# Patient Record
Sex: MALE | Race: WHITE | NOT HISPANIC OR LATINO | URBAN - METROPOLITAN AREA
[De-identification: names, ages, dates, MRNs, and addresses within clinical notes are randomized per-mention and may not be internally consistent; named-entity substitution may affect disease eponyms.]

---

## 2017-06-13 ENCOUNTER — CONVERSION ENCOUNTER (OUTPATIENT)
Dept: FAMILY MEDICINE CLINIC | Facility: CLINIC | Age: 42
End: 2017-06-13

## 2017-06-13 LAB
ALBUMIN SERPL-MCNC: 4.6 G/DL (ref 3.6–5.1)
ALBUMIN/GLOB SERPL: NORMAL {RATIO} (ref 1–2.1)
ALP SERPL-CCNC: 48 UNITS/L (ref 40–115)
ALT SERPL-CCNC: 15 UNITS/L (ref 8–46)
AST SERPL-CCNC: 24 UNITS/L (ref 10–40)
BASOPHILS NFR BLD AUTO: 1 %
BILIRUB SERPL-MCNC: 1 MG/DL (ref 0.2–1.2)
BUN SERPL-MCNC: 12 MG/DL (ref 7–25)
BUN/CREAT SERPL: NORMAL (ref 6–22)
CALCIUM SERPL-MCNC: 9.4 MG/DL (ref 8.6–10.2)
CHLORIDE SERPL-SCNC: 99 MMOL/L (ref 98–110)
CHOLEST SERPL-MCNC: 199 MG/DL (ref 125–200)
CHOLEST/HDLC SERPL: NORMAL {RATIO}
CONV CO2: 28 MMOL/L (ref 21–33)
CONV NEUTROPHILS/100 LEUKOCYTES IN BODY FLUID BY MANUAL COUNT: 51 %
CONV TOTAL PROTEIN: 6.8 G/DL (ref 6.2–8.3)
CREAT UR-MCNC: 0.8 MG/DL (ref 0.78–1.34)
EOSINOPHIL # BLD AUTO: 6 %
EOSINOPHIL # BLD AUTO: NORMAL 10*3/MM3 (ref 15–500)
ERYTHROCYTE [DISTWIDTH] IN BLOOD BY AUTOMATED COUNT: 12.5 % (ref 11–15)
GLOBULIN UR ELPH-MCNC: NORMAL G/DL (ref 2.1–3.7)
GLUCOSE SERPL-MCNC: 81 MG/DL (ref 65–99)
HCT VFR BLD AUTO: 44.3 % (ref 38.5–50)
HDLC SERPL-MCNC: 74 MG/DL
HGB BLD-MCNC: 15 G/DL (ref 13.2–17.1)
LDLC SERPL CALC-MCNC: 114 MG/DL
LYMPHOCYTES # BLD AUTO: NORMAL 10*3/MM3 (ref 850–3900)
LYMPHOCYTES NFR BLD AUTO: 33 %
MCH RBC QN AUTO: 31.6 PG (ref 27–33)
MCHC RBC AUTO-ENTMCNC: NORMAL % (ref 32–36)
MCV RBC AUTO: 93.2 FL (ref 80–100)
MONOCYTES # BLD AUTO: NORMAL 10*3/MICROLITER (ref 200–950)
MONOCYTES NFR BLD AUTO: 10 %
NEUTROPHILS # BLD AUTO: NORMAL 10*3/MM3 (ref 1500–7800)
PLATELET # BLD AUTO: NORMAL 10*3/MM3 (ref 140–400)
PMV BLD AUTO: 8.3 FL (ref 7.5–11.5)
POTASSIUM SERPL-SCNC: 4.2 MMOL/L (ref 3.5–5.3)
PSA SERPL-MCNC: 0.7 NG/ML
RBC # BLD AUTO: NORMAL 10*6/MM3 (ref 4.2–5.8)
SODIUM SERPL-SCNC: 136 MMOL/L (ref 135–146)
TRIGL SERPL-MCNC: 57 MG/DL
TSH SERPL-ACNC: 3.05 MIU/L (ref 0.4–4.5)
WBC # BLD AUTO: NORMAL 10*3/MM3 (ref 3.8–10.8)

## 2020-07-02 ENCOUNTER — OFFICE VISIT (OUTPATIENT)
Dept: FAMILY MEDICINE CLINIC | Facility: CLINIC | Age: 45
End: 2020-07-02

## 2020-07-02 VITALS
HEART RATE: 72 BPM | SYSTOLIC BLOOD PRESSURE: 124 MMHG | WEIGHT: 155 LBS | BODY MASS INDEX: 22.19 KG/M2 | HEIGHT: 70 IN | TEMPERATURE: 98.2 F | DIASTOLIC BLOOD PRESSURE: 73 MMHG | OXYGEN SATURATION: 98 %

## 2020-07-02 DIAGNOSIS — Z00.00 PREVENTATIVE HEALTH CARE: Primary | ICD-10-CM

## 2020-07-02 DIAGNOSIS — Z12.5 SCREENING PSA (PROSTATE SPECIFIC ANTIGEN): ICD-10-CM

## 2020-07-02 DIAGNOSIS — J30.9 ALLERGIC RHINITIS, UNSPECIFIED SEASONALITY, UNSPECIFIED TRIGGER: ICD-10-CM

## 2020-07-02 DIAGNOSIS — H92.01 EAR PAIN, RIGHT: ICD-10-CM

## 2020-07-02 PROBLEM — F40.240 CLAUSTROPHOBIA: Status: ACTIVE | Noted: 2017-06-12

## 2020-07-02 PROCEDURE — 99213 OFFICE O/P EST LOW 20 MIN: CPT | Performed by: NURSE PRACTITIONER

## 2020-07-02 RX ORDER — FLUTICASONE PROPIONATE 50 MCG
2 SPRAY, SUSPENSION (ML) NASAL DAILY
COMMUNITY

## 2020-07-02 RX ORDER — AZITHROMYCIN 250 MG/1
TABLET, FILM COATED ORAL
Qty: 6 TABLET | Refills: 0 | Status: SHIPPED | OUTPATIENT
Start: 2020-07-02 | End: 2020-08-19

## 2020-07-02 NOTE — PATIENT INSTRUCTIONS
Take Z-Pavan as directed with food  Allergy medication as discussed  Stop using Debrox eardrops so much  Fasting lipid panel when possible  Schedule eye exam

## 2020-07-02 NOTE — PROGRESS NOTES
"    Felix Jarrell is a 44 y.o. male.     44-year-old white male with history of claustrophobia, allergic rhinitis who comes in today complaining of right ear with some drainage in the morning.  Seems a little opatient paranoid about his ears and uses Debrox quite often.  Ear canals were not red or tender to touch and there is liquid wax in ear canals which is probably from Debrox.  No infection seen with TMs however he does have a odd anatomy.  I am placing him on a Z-Pavan at patient's request.  He takes no other medications.  I recommended he take allergy medication to help with muffled ear sounds due to bulging TM's  Blood pressure 124/72 heart rate 72 he denies any chest pain, dyspnea, tachycardia or dizziness  Weight is 155  Patient has not had any blood work or preventative maintenance in almost 4 years I am drawing CBC CMP and PSA today he is going to get a fasting cholesterol panel and schedule an eye exam    Z-Pavan  CBC/CMP/PSA  Schedule fasting lipid panel   schedule eye exam  Do not use Debrox eardrops to frequently         The following portions of the patient's history were reviewed and updated as appropriate: allergies, current medications, past family history, past medical history, past social history, past surgical history and problem list.    Vitals:    07/02/20 1036   BP: 124/73   BP Location: Right arm   Patient Position: Sitting   Cuff Size: Adult   Pulse: 72   Temp: 98.2 °F (36.8 °C)   TempSrc: Temporal   SpO2: 98%   Weight: 70.3 kg (155 lb)   Height: 177.8 cm (70\")     Body mass index is 22.24 kg/m².    History reviewed. No pertinent past medical history.  History reviewed. No pertinent surgical history.  History reviewed. No pertinent family history.    There is no immunization history on file for this patient.    Conversion Encounter on 06/13/2017   Component Date Value Ref Range Status   • Albumin 06/13/2017 4.6  3.6 - 5.1 g/dL Final   • Alkaline Phosphatase 06/13/2017 48  40 - 115 units/L " Final   • Basophil Rel % 06/13/2017 1  % Final   • Glucose 06/13/2017 81  65 - 99 mg/dL Final   • Total Bilirubin 06/13/2017 1.0  0.2 - 1.2 mg/dL Final   • BUN 06/13/2017 12  7 - 25 mg/dL Final   • BUN/Creatinine Ratio 06/13/2017 15.0 (calc)  6 - 22 Final   • Calcium 06/13/2017 9.4  8.6 - 10.2 mg/dL Final   • Chloride 06/13/2017 99  98 - 110 mmol/L Final   • Chol/HDL Ratio 06/13/2017 2.7 (calc)  <5.1 Final   • Total Cholesterol 06/13/2017 199  125 - 200 mg/dL Final   • CO2 06/13/2017 28  21 - 33 mmol/L Final   • Creatinine 06/13/2017 0.8  0.78 - 1.34 mg/dL Final   • eGFR  Am 06/13/2017 >60 mL/min/1.73m2  >59 mL/min/1.73m2 Final   • eGFR Non African Am 06/13/2017 >60 mL/min/1.73m2  >59 mL/min/1.73m2 Final   • Eosinophils Absolute 06/13/2017 200 CELLS/UL  15 - 500 10*3/mm3 Final   • Eosinophil Rel % 06/13/2017 6  % Final   • Globulin 06/13/2017 2.2 G/DL (CALC)  2.1 - 3.7 g/dL Final   • Hematocrit 06/13/2017 44.3  38.5 - 50.0 % Final   • HDL Cholesterol 06/13/2017 74  >40 mg/dL Final   • Hemoglobin 06/13/2017 15.0  13.2 - 17.1 g/dL Final   • LDL Cholesterol  06/13/2017 114  <130 mg/dL Final   • Lymphocytes Absolute 06/13/2017 1200 CELLS/UL  850 - 3900 10*3/mm3 Final   • Lymphocyte Rel % 06/13/2017 33  % Final   • MCH 06/13/2017 31.6  27.0 - 33.0 pg Final   • MCHC 06/13/2017 33.9 G/DL  32.0 - 36.0 % Final   • MCV 06/13/2017 93.2  80.0 - 100.0 fL Final   • Monocyte Rel % 06/13/2017 10  % Final   • Monocytes Absolute 06/13/2017 400 CELLS/UL  200 - 950 10*3/microliter Final   • MPV 06/13/2017 8.3  7.5 - 11.5 fL Final   • Neutrophils Absolute 06/13/2017 1900 CELLS/UL  1500 - 7800 10*3/mm3 Final   • Platelets 06/13/2017 185 THOUSAND/UL  140 - 400 10*3/mm3 Final   • Neutrophils, Fluid 06/13/2017 51  % Final   • Potassium 06/13/2017 4.2  3.5 - 5.3 mmol/L Final   • Total Protein 06/13/2017 6.8  6.2 - 8.3 g/dL Final   • PSA 06/13/2017 0.7  < OR = 4.0 ng/mL Final   • RBC 06/13/2017 4.75 MILLION/UL  4.20 - 5.80 10*6/mm3  Final   • RDW 06/13/2017 12.5  11.0 - 15.0 % Final   • AST (SGOT) 06/13/2017 24  10 - 40 units/L Final   • ALT (SGPT) 06/13/2017 15  8 - 46 units/L Final   • Sodium 06/13/2017 136  135 - 146 mmol/L Final   • Triglycerides 06/13/2017 57  <150 mg/dL Final   • TSH 06/13/2017 3.05  0.40 - 4.50 mIU/L Final   • WBC 06/13/2017 3.8 THOUSAND/UL  3.8 - 10.8 10*3/mm3 Final   • A/G Ratio 06/13/2017 2.1 (calc)  1.0 - 2.1 Final         Review of Systems   Constitutional: Negative.    HENT: Positive for ear discharge.    Respiratory: Negative.    Cardiovascular: Negative.    Gastrointestinal: Negative.    Genitourinary: Negative.    Musculoskeletal: Negative.    Skin: Negative.    Neurological: Negative.    Psychiatric/Behavioral: Negative.        Objective   Physical Exam   Constitutional: He is oriented to person, place, and time. He appears well-developed and well-nourished.   HENT:   Some liquid wax in ear canal, but no swelling , redness or tenderness   Cardiovascular: Normal rate and regular rhythm.   Pulmonary/Chest: Effort normal and breath sounds normal.   Abdominal: Soft. Bowel sounds are normal.   Musculoskeletal: Normal range of motion.   Neurological: He is alert and oriented to person, place, and time.   Skin: Skin is warm and dry.   Psychiatric: He has a normal mood and affect.       Procedures    Assessment/Plan   Felix was seen today for earache.    Diagnoses and all orders for this visit:    Preventative health care  -     CBC & Differential  -     Comprehensive Metabolic Panel    Screening PSA (prostate specific antigen)  -     PSA Screen    Allergic rhinitis, unspecified seasonality, unspecified trigger    BMI 22.0-22.9, adult    Ear pain, right    Other orders  -     azithromycin (Zithromax Z-Pavan) 250 MG tablet; Take 2 tablets the first day, then 1 tablet daily for 4 days.          Current Outpatient Medications:   •  fluticasone (FLONASE) 50 MCG/ACT nasal spray, 2 sprays into the nostril(s) as directed by  provider Daily., Disp: , Rfl:   •  azithromycin (Zithromax Z-Pavan) 250 MG tablet, Take 2 tablets the first day, then 1 tablet daily for 4 days., Disp: 6 tablet, Rfl: 0

## 2020-07-03 LAB
ALBUMIN SERPL-MCNC: 4.9 G/DL (ref 4–5)
ALBUMIN/GLOB SERPL: 2 {RATIO} (ref 1.2–2.2)
ALP SERPL-CCNC: 65 IU/L (ref 39–117)
ALT SERPL-CCNC: 9 IU/L (ref 0–44)
AST SERPL-CCNC: 19 IU/L (ref 0–40)
BASOPHILS # BLD AUTO: 0.1 X10E3/UL (ref 0–0.2)
BASOPHILS NFR BLD AUTO: 1 %
BILIRUB SERPL-MCNC: 0.4 MG/DL (ref 0–1.2)
BUN SERPL-MCNC: 15 MG/DL (ref 6–24)
BUN/CREAT SERPL: 16 (ref 9–20)
CALCIUM SERPL-MCNC: 9.5 MG/DL (ref 8.7–10.2)
CHLORIDE SERPL-SCNC: 100 MMOL/L (ref 96–106)
CO2 SERPL-SCNC: 29 MMOL/L (ref 20–29)
CREAT SERPL-MCNC: 0.95 MG/DL (ref 0.76–1.27)
EOSINOPHIL # BLD AUTO: 0.2 X10E3/UL (ref 0–0.4)
EOSINOPHIL NFR BLD AUTO: 5 %
ERYTHROCYTE [DISTWIDTH] IN BLOOD BY AUTOMATED COUNT: 12.3 % (ref 11.6–15.4)
GLOBULIN SER CALC-MCNC: 2.5 G/DL (ref 1.5–4.5)
GLUCOSE SERPL-MCNC: 88 MG/DL (ref 65–99)
HCT VFR BLD AUTO: 44.7 % (ref 37.5–51)
HGB BLD-MCNC: 15 G/DL (ref 13–17.7)
IMM GRANULOCYTES # BLD AUTO: 0 X10E3/UL (ref 0–0.1)
IMM GRANULOCYTES NFR BLD AUTO: 0 %
LYMPHOCYTES # BLD AUTO: 1.3 X10E3/UL (ref 0.7–3.1)
LYMPHOCYTES NFR BLD AUTO: 28 %
MCH RBC QN AUTO: 33.2 PG (ref 26.6–33)
MCHC RBC AUTO-ENTMCNC: 33.6 G/DL (ref 31.5–35.7)
MCV RBC AUTO: 99 FL (ref 79–97)
MONOCYTES # BLD AUTO: 0.5 X10E3/UL (ref 0.1–0.9)
MONOCYTES NFR BLD AUTO: 11 %
NEUTROPHILS # BLD AUTO: 2.5 X10E3/UL (ref 1.4–7)
NEUTROPHILS NFR BLD AUTO: 55 %
PLATELET # BLD AUTO: 252 X10E3/UL (ref 150–450)
POTASSIUM SERPL-SCNC: 5 MMOL/L (ref 3.5–5.2)
PROT SERPL-MCNC: 7.4 G/DL (ref 6–8.5)
PSA SERPL-MCNC: 0.8 NG/ML (ref 0–4)
RBC # BLD AUTO: 4.52 X10E6/UL (ref 4.14–5.8)
SODIUM SERPL-SCNC: 140 MMOL/L (ref 134–144)
WBC # BLD AUTO: 4.5 X10E3/UL (ref 3.4–10.8)

## 2020-08-19 ENCOUNTER — OFFICE VISIT (OUTPATIENT)
Dept: FAMILY MEDICINE CLINIC | Facility: CLINIC | Age: 45
End: 2020-08-19

## 2020-08-19 VITALS
HEART RATE: 61 BPM | SYSTOLIC BLOOD PRESSURE: 110 MMHG | DIASTOLIC BLOOD PRESSURE: 64 MMHG | OXYGEN SATURATION: 98 % | BODY MASS INDEX: 21.47 KG/M2 | HEIGHT: 70 IN | WEIGHT: 150 LBS | TEMPERATURE: 97.7 F

## 2020-08-19 DIAGNOSIS — H61.23 BILATERAL IMPACTED CERUMEN: Primary | ICD-10-CM

## 2020-08-19 PROCEDURE — 99213 OFFICE O/P EST LOW 20 MIN: CPT | Performed by: NURSE PRACTITIONER

## 2020-08-19 NOTE — PATIENT INSTRUCTIONS
Suggest irrigating ears every 3 to 6 months  Avoid using too much Debrox  Try to keep water out of ears when possible  If any problem with ears before the next  irrigation call office

## 2020-08-19 NOTE — PROGRESS NOTES
"    Felix Jarrell is a 45 y.o. male.     45-year-old white male with history of claustrophobia, allergic rhinitis who comes in today once again complaining of hearing loss and ears.  I think patient gets a little fixated on his ears and uses Debrox too much.  Cerebrum was a liquidy cream-colored drainage that was not normal for most people .   Ears were irrigated successfully bilaterally and ear canals are red with bulging TM's.  Patient states he can hear much better.  I informed patient he should try to get ears irrigated every 3 to 6 months and if he has any further problems with his ears before that to call me  Blood pressure 110/64 heart rate 60 he denies any chest pain, dyspnea, tachycardia or dizziness  Weight is 150    Recommend ear irrigation every 3 to 6 months  Any further problems with ears call office  Avoid using Debrox frequently       The following portions of the patient's history were reviewed and updated as appropriate: allergies, current medications, past family history, past medical history, past social history, past surgical history and problem list.    Vitals:    08/19/20 1140   BP: 110/64   BP Location: Right arm   Patient Position: Sitting   Cuff Size: Adult   Pulse: 61   Temp: 97.7 °F (36.5 °C)   TempSrc: Temporal   SpO2: 98%   Weight: 68 kg (150 lb)   Height: 177.8 cm (70\")     Body mass index is 21.52 kg/m².    History reviewed. No pertinent past medical history.  No past surgical history on file.  History reviewed. No pertinent family history.    There is no immunization history on file for this patient.    Orders Only on 07/02/2020   Component Date Value Ref Range Status   • WBC 07/02/2020 4.5  3.4 - 10.8 x10E3/uL Final   • RBC 07/02/2020 4.52  4.14 - 5.80 x10E6/uL Final   • Hemoglobin 07/02/2020 15.0  13.0 - 17.7 g/dL Final   • Hematocrit 07/02/2020 44.7  37.5 - 51.0 % Final   • MCV 07/02/2020 99* 79 - 97 fL Final   • MCH 07/02/2020 33.2* 26.6 - 33.0 pg Final   • MCHC 07/02/2020 33.6  " 31.5 - 35.7 g/dL Final   • RDW 07/02/2020 12.3  11.6 - 15.4 % Final   • Platelets 07/02/2020 252  150 - 450 x10E3/uL Final   • Neutrophil Rel % 07/02/2020 55  Not Estab. % Final   • Lymphocyte Rel % 07/02/2020 28  Not Estab. % Final   • Monocyte Rel % 07/02/2020 11  Not Estab. % Final   • Eosinophil Rel % 07/02/2020 5  Not Estab. % Final   • Basophil Rel % 07/02/2020 1  Not Estab. % Final   • Neutrophils Absolute 07/02/2020 2.5  1.4 - 7.0 x10E3/uL Final   • Lymphocytes Absolute 07/02/2020 1.3  0.7 - 3.1 x10E3/uL Final   • Monocytes Absolute 07/02/2020 0.5  0.1 - 0.9 x10E3/uL Final   • Eosinophils Absolute 07/02/2020 0.2  0.0 - 0.4 x10E3/uL Final   • Basophils Absolute 07/02/2020 0.1  0.0 - 0.2 x10E3/uL Final   • Immature Granulocyte Rel % 07/02/2020 0  Not Estab. % Final   • Immature Grans Absolute 07/02/2020 0.0  0.0 - 0.1 x10E3/uL Final   • Glucose 07/02/2020 88  65 - 99 mg/dL Final   • BUN 07/02/2020 15  6 - 24 mg/dL Final   • Creatinine 07/02/2020 0.95  0.76 - 1.27 mg/dL Final   • eGFR Non African Am 07/02/2020 98  >59 mL/min/1.73 Final   • eGFR African Am 07/02/2020 113  >59 mL/min/1.73 Final   • BUN/Creatinine Ratio 07/02/2020 16  9 - 20 Final   • Sodium 07/02/2020 140  134 - 144 mmol/L Final   • Potassium 07/02/2020 5.0  3.5 - 5.2 mmol/L Final   • Chloride 07/02/2020 100  96 - 106 mmol/L Final   • Total CO2 07/02/2020 29  20 - 29 mmol/L Final   • Calcium 07/02/2020 9.5  8.7 - 10.2 mg/dL Final   • Total Protein 07/02/2020 7.4  6.0 - 8.5 g/dL Final   • Albumin 07/02/2020 4.9  4.0 - 5.0 g/dL Final   • Globulin 07/02/2020 2.5  1.5 - 4.5 g/dL Final   • A/G Ratio 07/02/2020 2.0  1.2 - 2.2 Final   • Total Bilirubin 07/02/2020 0.4  0.0 - 1.2 mg/dL Final   • Alkaline Phosphatase 07/02/2020 65  39 - 117 IU/L Final   • AST (SGOT) 07/02/2020 19  0 - 40 IU/L Final   • ALT (SGPT) 07/02/2020 9  0 - 44 IU/L Final   • PSA 07/02/2020 0.8  0.0 - 4.0 ng/mL Final    Comment: Roche ECLIA methodology.  According to the [employer], Serum PSA should  decrease and remain at undetectable levels after radical  prostatectomy. The AUA defines biochemical recurrence as an initial  PSA value 0.2 ng/mL or greater followed by a subsequent confirmatory  PSA value 0.2 ng/mL or greater.  Values obtained with different assay methods or kits cannot be used  interchangeably. Results cannot be interpreted as absolute evidence  of the presence or absence of malignant disease.           Review of Systems   Constitutional: Negative.    HENT: Positive for ear discharge and hearing loss.        Objective   Physical Exam   Constitutional: He is oriented to person, place, and time. He appears well-developed and well-nourished.   HENT:   Cream-yellow liquid type ear drainage that irrigated easily leaving red ear canals bulging TMs   Cardiovascular: Normal rate and regular rhythm.   Pulmonary/Chest: Effort normal and breath sounds normal.   Abdominal: Soft. Bowel sounds are normal.   Musculoskeletal: Normal range of motion.   Neurological: He is alert and oriented to person, place, and time.   Skin: Skin is warm and dry.   Psychiatric: He has a normal mood and affect.       Procedures    Assessment/Plan   Felix was seen today for cerumen impaction.    Diagnoses and all orders for this visit:    Bilateral impacted cerumen    BMI 21.0-21.9, adult          Current Outpatient Medications:   •  fluticasone (FLONASE) 50 MCG/ACT nasal spray, 2 sprays into the nostril(s) as directed by provider Daily., Disp: , Rfl: